# Patient Record
Sex: FEMALE | Race: WHITE
[De-identification: names, ages, dates, MRNs, and addresses within clinical notes are randomized per-mention and may not be internally consistent; named-entity substitution may affect disease eponyms.]

---

## 2024-06-11 PROBLEM — Z00.00 ENCOUNTER FOR PREVENTIVE HEALTH EXAMINATION: Status: ACTIVE | Noted: 2024-06-11

## 2024-06-12 ENCOUNTER — APPOINTMENT (OUTPATIENT)
Dept: GERIATRICS | Facility: CLINIC | Age: 84
End: 2024-06-12
Payer: MEDICARE

## 2024-06-12 VITALS
OXYGEN SATURATION: 98 % | HEART RATE: 89 BPM | TEMPERATURE: 96.6 F | BODY MASS INDEX: 20.58 KG/M2 | SYSTOLIC BLOOD PRESSURE: 110 MMHG | HEIGHT: 61 IN | DIASTOLIC BLOOD PRESSURE: 82 MMHG | WEIGHT: 109 LBS

## 2024-06-12 DIAGNOSIS — S09.90XA UNSPECIFIED INJURY OF HEAD, INITIAL ENCOUNTER: ICD-10-CM

## 2024-06-12 DIAGNOSIS — I10 ESSENTIAL (PRIMARY) HYPERTENSION: ICD-10-CM

## 2024-06-12 DIAGNOSIS — M81.0 AGE-RELATED OSTEOPOROSIS W/OUT CURRENT PATHOLOGICAL FRACTURE: ICD-10-CM

## 2024-06-12 DIAGNOSIS — R41.89 OTHER SYMPTOMS AND SIGNS INVOLVING COGNITIVE FUNCTIONS AND AWARENESS: ICD-10-CM

## 2024-06-12 DIAGNOSIS — Z12.31 ENCOUNTER FOR SCREENING MAMMOGRAM FOR MALIGNANT NEOPLASM OF BREAST: ICD-10-CM

## 2024-06-12 DIAGNOSIS — Z91.81 HISTORY OF FALLING: ICD-10-CM

## 2024-06-12 DIAGNOSIS — F32.A DEPRESSION, UNSPECIFIED: ICD-10-CM

## 2024-06-12 PROCEDURE — G2211 COMPLEX E/M VISIT ADD ON: CPT

## 2024-06-12 PROCEDURE — 99205 OFFICE O/P NEW HI 60 MIN: CPT

## 2024-06-12 PROCEDURE — G0444 DEPRESSION SCREEN ANNUAL: CPT | Mod: 59

## 2024-06-12 RX ORDER — SERTRALINE HYDROCHLORIDE 50 MG/1
50 TABLET, FILM COATED ORAL DAILY
Refills: 0 | Status: ACTIVE | COMMUNITY
Start: 2024-06-12

## 2024-06-12 RX ORDER — LAMOTRIGINE 25 MG/1
25 TABLET ORAL DAILY
Refills: 0 | Status: ACTIVE | COMMUNITY
Start: 2024-06-12

## 2024-06-12 RX ORDER — LOSARTAN POTASSIUM 25 MG/1
25 TABLET, FILM COATED ORAL DAILY
Refills: 0 | Status: ACTIVE | COMMUNITY
Start: 2024-06-12

## 2024-06-12 NOTE — HISTORY OF PRESENT ILLNESS
[3] : 1) Little interest or pleasure doing things for nearly every day (3) [2] : 2) Feeling down, depressed, or hopeless for more than half of the days (2) [PHQ-2 Positive] : PHQ-2 Positive [I have developed a follow-up plan documented below in the note.] : I have developed a follow-up plan documented below in the note. [FreeTextEntry1] : SAUL HO is a 84 year yo White  female is coming in today to . Patient has PMHx as listed below   PCP is Dr. Mckenna Alvarado  #HTN on [id number] QD  #Depression zoloft 75mg QD on Lamotrigine, not sure about dosage  #Mitral valve prolapse  #Calcified aortic valve  #Osteoporosis needs Bone density scan  #orthostatic hypotension  covid in jan 2024, took toll on her neurologically and physically. was on oxygen at home for a while Her son passed away in feb 2024.  She has fallen 2 weeks ago. hit her head. does not remember the fall  Son stays in [place].   Psych NP recommended neurological. has been more forgetful.   last night had trouble sleeping. thinks it is the tylenol.  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)     Medications: -Anticholinergic burden: no     Mobility:   -Chronic pain: no -Constipation: n -Urinary symptoms: has leakage. wears depends; last one week no accidents   Frail Scale: 0/5   -Are you fatigued? y -Can you walk up one flight of stairs?y -Can you walk one block? y -Do you have more than 5 illnesses?n -Have you lost more than 5% of your weight in last 6 months?not sure       Mentation: -Hx of dementia:n -h/o delirium:n -Cognitive enhancing agents:n       Sleep: not sure STOP-BANG Snoring:[ ] Tiredness:[ ] Observed Apnea:[ ] Pressure: High blood pressure[ ] BMI: higher than 35.[ ] Age: older than 50[ ] Neck Circumference: greater than 16 inches is considered a risk factor.[ ] Gender: Males[ ]     Matters Most     [de-identified] : takes tylenol for pain . not suicidal. following with psychiatrist every week or as needed. [DEV2Vnhec] : 5

## 2024-06-12 NOTE — PHYSICAL EXAM
[Version 1] : Word list version 1 - Banana, Sunrise, Chair [Normal Clock Drawn, with correct arm position (2 points)] : normal clock drawn, with correct arm position (2 points) [2 Words (2 points)] : 2 words (2 points) [4 Points] : 4 points [No Respiratory Distress] : no respiratory distress [No Acc Muscle Use] : no accessory muscle use [Respiration, Rhythm And Depth] : normal respiratory rhythm and effort [Auscultation Breath Sounds / Voice Sounds] : lungs were clear to auscultation bilaterally [Normal S1, S2] : normal S1 and S2 [Heart Rate And Rhythm] : heart rate was normal and rhythm regular [No Clubbing, Cyanosis] : no clubbing or cyanosis of the fingernails [No Joint Swelling] : no joint swelling seen [de-identified] : cautious gait; reduced overall strength

## 2024-06-12 NOTE — SOCIAL HISTORY
[Alone] : lives alone [House] : in a house [FreeTextEntry1] : daughter in law comes over. somebody comes over every day.

## 2024-07-30 ENCOUNTER — TRANSCRIPTION ENCOUNTER (OUTPATIENT)
Age: 84
End: 2024-07-30

## 2024-08-01 ENCOUNTER — TRANSCRIPTION ENCOUNTER (OUTPATIENT)
Age: 84
End: 2024-08-01

## 2024-09-09 ENCOUNTER — TRANSCRIPTION ENCOUNTER (OUTPATIENT)
Age: 84
End: 2024-09-09

## 2024-11-08 ENCOUNTER — RESULT REVIEW (OUTPATIENT)
Age: 84
End: 2024-11-08

## 2024-11-08 ENCOUNTER — APPOINTMENT (OUTPATIENT)
Dept: GERIATRICS | Facility: CLINIC | Age: 84
End: 2024-11-08
Payer: MEDICARE

## 2024-11-08 VITALS
BODY MASS INDEX: 20.96 KG/M2 | TEMPERATURE: 98.3 F | OXYGEN SATURATION: 95 % | DIASTOLIC BLOOD PRESSURE: 72 MMHG | SYSTOLIC BLOOD PRESSURE: 120 MMHG | WEIGHT: 111 LBS | HEIGHT: 61 IN | HEART RATE: 80 BPM

## 2024-11-08 DIAGNOSIS — I34.1 NONRHEUMATIC MITRAL (VALVE) PROLAPSE: ICD-10-CM

## 2024-11-08 DIAGNOSIS — Z71.85 ENCOUNTER FOR IMMUNIZATION SAFETY COUNSELING: ICD-10-CM

## 2024-11-08 DIAGNOSIS — Z91.81 HISTORY OF FALLING: ICD-10-CM

## 2024-11-08 DIAGNOSIS — R41.89 OTHER SYMPTOMS AND SIGNS INVOLVING COGNITIVE FUNCTIONS AND AWARENESS: ICD-10-CM

## 2024-11-08 PROCEDURE — G2211 COMPLEX E/M VISIT ADD ON: CPT

## 2024-11-08 PROCEDURE — 99215 OFFICE O/P EST HI 40 MIN: CPT

## 2024-12-10 RX ORDER — THIAMINE HYDROCHLORIDE, RIBOFLAVIN 5-PHOSPHATE SODIUM, PYRIDOXINE HYDROCHLORIDE, DEXPANTHENOL, NIACINAMIDE 100; 2; 2; 2; 100 MG/ML; MG/ML; MG/ML; MG/ML; MG/ML
INJECTION, SOLUTION INTRAMUSCULAR; INTRAVENOUS
Refills: 0 | Status: ACTIVE | COMMUNITY

## 2025-01-03 ENCOUNTER — APPOINTMENT (OUTPATIENT)
Dept: GERIATRICS | Facility: CLINIC | Age: 85
End: 2025-01-03

## 2025-01-03 VITALS
SYSTOLIC BLOOD PRESSURE: 110 MMHG | WEIGHT: 112 LBS | DIASTOLIC BLOOD PRESSURE: 70 MMHG | OXYGEN SATURATION: 96 % | TEMPERATURE: 98.6 F | BODY MASS INDEX: 21.14 KG/M2 | HEART RATE: 76 BPM | HEIGHT: 61 IN

## 2025-01-03 DIAGNOSIS — F03.B0 UNSPECIFIED DEMENTIA, MODERATE, WITHOUT BEHAVIORAL DISTURBANCE, PSYCHOTIC DISTURBANCE, MOOD DISTURBANCE, AND ANXIETY: ICD-10-CM

## 2025-01-03 DIAGNOSIS — R41.89 OTHER SYMPTOMS AND SIGNS INVOLVING COGNITIVE FUNCTIONS AND AWARENESS: ICD-10-CM

## 2025-01-03 PROCEDURE — G0444 DEPRESSION SCREEN ANNUAL: CPT | Mod: 59

## 2025-01-03 PROCEDURE — 99483 ASSMT & CARE PLN PT COG IMP: CPT

## 2025-01-04 PROBLEM — F03.B0 MODERATE DEMENTIA: Status: ACTIVE | Noted: 2025-01-04

## 2025-03-05 ENCOUNTER — RESULT REVIEW (OUTPATIENT)
Age: 85
End: 2025-03-05

## 2025-04-16 ENCOUNTER — TRANSCRIPTION ENCOUNTER (OUTPATIENT)
Age: 85
End: 2025-04-16

## 2025-06-24 ENCOUNTER — NON-APPOINTMENT (OUTPATIENT)
Age: 85
End: 2025-06-24

## 2025-06-24 ENCOUNTER — APPOINTMENT (OUTPATIENT)
Dept: NEUROLOGY | Facility: CLINIC | Age: 85
End: 2025-06-24
Payer: MEDICARE

## 2025-06-24 PROCEDURE — 95816 EEG AWAKE AND DROWSY: CPT

## 2025-06-25 ENCOUNTER — APPOINTMENT (OUTPATIENT)
Dept: NEUROLOGY | Facility: CLINIC | Age: 85
End: 2025-06-25

## 2025-06-25 PROCEDURE — 95708 EEG WO VID EA 12-26HR UNMNTR: CPT

## 2025-06-25 PROCEDURE — 95719 EEG PHYS/QHP EA INCR W/O VID: CPT

## 2025-06-25 PROCEDURE — 95700 EEG CONT REC W/VID EEG TECH: CPT

## 2025-07-23 ENCOUNTER — TRANSCRIPTION ENCOUNTER (OUTPATIENT)
Age: 85
End: 2025-07-23

## 2025-09-11 ENCOUNTER — NON-APPOINTMENT (OUTPATIENT)
Age: 85
End: 2025-09-11